# Patient Record
Sex: MALE | Race: WHITE | NOT HISPANIC OR LATINO | Employment: FULL TIME | URBAN - METROPOLITAN AREA
[De-identification: names, ages, dates, MRNs, and addresses within clinical notes are randomized per-mention and may not be internally consistent; named-entity substitution may affect disease eponyms.]

---

## 2019-10-21 ENCOUNTER — OFFICE VISIT (OUTPATIENT)
Dept: URGENT CARE | Facility: URGENT CARE | Age: 34
End: 2019-10-21
Payer: COMMERCIAL

## 2019-10-21 VITALS
SYSTOLIC BLOOD PRESSURE: 112 MMHG | DIASTOLIC BLOOD PRESSURE: 66 MMHG | OXYGEN SATURATION: 99 % | WEIGHT: 187 LBS | HEART RATE: 59 BPM | TEMPERATURE: 98.6 F

## 2019-10-21 DIAGNOSIS — L03.011 CELLULITIS OF RIGHT INDEX FINGER: Primary | ICD-10-CM

## 2019-10-21 PROCEDURE — 99203 OFFICE O/P NEW LOW 30 MIN: CPT | Performed by: PHYSICIAN ASSISTANT

## 2019-10-21 RX ORDER — CEPHALEXIN 500 MG/1
500 CAPSULE ORAL 3 TIMES DAILY
Qty: 30 CAPSULE | Refills: 0 | Status: SHIPPED | OUTPATIENT
Start: 2019-10-21 | End: 2019-10-31

## 2019-10-21 NOTE — PROGRESS NOTES
SUBJECTIVE:  Chavo Vasquez is a 34 year old male who comes in for infected finger.  States that about 1 week ago he cut the tip of the right second finger off with cooking mandolin.  Was seen a few days later at the [facility] and pressure bandage and gel foam applied.  Had kept covered.  Today noticed that finger is red and swollen and thinks that infected. No fever noted  No drainage.  Denies any other sx and is otherwise in normal state of good health    PMH  No underlying medical issue generally healthy     MED  Takes no daily med    Social History     Socioeconomic History     Marital status:      Spouse name: Not on file     Number of children: Not on file     Years of education: Not on file     Highest education level: Not on file   Occupational History     Not on file   Social Needs     Financial resource strain: Not on file     Food insecurity:     Worry: Not on file     Inability: Not on file     Transportation needs:     Medical: Not on file     Non-medical: Not on file   Tobacco Use     Smoking status: Never Smoker     Smokeless tobacco: Never Used   Substance and Sexual Activity     Alcohol use: Not on file     Drug use: Not on file     Sexual activity: Not on file   Lifestyle     Physical activity:     Days per week: Not on file     Minutes per session: Not on file     Stress: Not on file   Relationships     Social connections:     Talks on phone: Not on file     Gets together: Not on file     Attends Zoroastrianism service: Not on file     Active member of club or organization: Not on file     Attends meetings of clubs or organizations: Not on file     Relationship status: Not on file     Intimate partner violence:     Fear of current or ex partner: Not on file     Emotionally abused: Not on file     Physically abused: Not on file     Forced sexual activity: Not on file   Other Topics Concern     Not on file   Social History Narrative     Not on file     ROS  Negative other than stated  above    Exam:  GENERAL APPEARANCE: healthy, alert and no distress  MS: extremities normal- no gross deformities noted, no evidence of inflammation in joints, FROM in all extremities.  SKIN: right second finger with tip of finger healing avulsion of skin.   There is moderate erythema up just past the DIP joint.  No abscess or drainage noted. Warmth to the touch  NEURO: Normal strength and tone, sensory exam grossly normal, mentation intact and speech normal    assessment/plan:  (L03.011) Cellulitis of right index finger  (primary encounter diagnosis)  Comment:  Plan: cephALEXin (KEFLEX) 500 MG capsule          Finger soaked and cleaned.  TD was already current.  Keflex as directed and signs of spreading infection discussed and watch for fevers, increased pain or drainage and RTC.  Patient notes understanding and agrees with above plan

## 2019-10-28 ENCOUNTER — OFFICE VISIT (OUTPATIENT)
Dept: URGENT CARE | Facility: URGENT CARE | Age: 34
End: 2019-10-28
Payer: COMMERCIAL

## 2019-10-28 VITALS
DIASTOLIC BLOOD PRESSURE: 78 MMHG | SYSTOLIC BLOOD PRESSURE: 132 MMHG | OXYGEN SATURATION: 98 % | HEART RATE: 58 BPM | TEMPERATURE: 97.4 F | WEIGHT: 187.4 LBS

## 2019-10-28 DIAGNOSIS — L03.011 CELLULITIS OF RIGHT INDEX FINGER: Primary | ICD-10-CM

## 2019-10-28 PROCEDURE — 99213 OFFICE O/P EST LOW 20 MIN: CPT | Performed by: PHYSICIAN ASSISTANT

## 2020-03-11 ENCOUNTER — HEALTH MAINTENANCE LETTER (OUTPATIENT)
Age: 35
End: 2020-03-11

## 2021-01-03 ENCOUNTER — HEALTH MAINTENANCE LETTER (OUTPATIENT)
Age: 36
End: 2021-01-03

## 2021-04-25 ENCOUNTER — HEALTH MAINTENANCE LETTER (OUTPATIENT)
Age: 36
End: 2021-04-25

## 2021-10-10 ENCOUNTER — HEALTH MAINTENANCE LETTER (OUTPATIENT)
Age: 36
End: 2021-10-10

## 2022-01-17 ENCOUNTER — PRE VISIT (OUTPATIENT)
Dept: UROLOGY | Facility: CLINIC | Age: 37
End: 2022-01-17
Payer: COMMERCIAL

## 2022-05-22 ENCOUNTER — HEALTH MAINTENANCE LETTER (OUTPATIENT)
Age: 37
End: 2022-05-22

## 2022-06-10 ENCOUNTER — TRANSFERRED RECORDS (OUTPATIENT)
Dept: HEALTH INFORMATION MANAGEMENT | Facility: CLINIC | Age: 37
End: 2022-06-10

## 2022-06-29 ENCOUNTER — VIRTUAL VISIT (OUTPATIENT)
Dept: UROLOGY | Facility: CLINIC | Age: 37
End: 2022-06-29
Payer: COMMERCIAL

## 2022-06-29 DIAGNOSIS — Z30.09 ENCOUNTER FOR VASECTOMY COUNSELING: Primary | ICD-10-CM

## 2022-06-29 PROCEDURE — 99203 OFFICE O/P NEW LOW 30 MIN: CPT | Mod: 95 | Performed by: UROLOGY

## 2022-06-29 NOTE — PROGRESS NOTES
Chavo Vasquez  who is being evaluated via a billable video visit.      How would you like to obtain your AVS? MyChart  If the video visit is dropped, the invitation should be resent by: Text to cell phone: [phone]  Will anyone else be joining your video visit? No    Video-Visit Details    Type of service:  Video Visit    Video Start Time: 1:16 PM    Video End Time:1:24 PM    Originating Location (pt. Location): Home    Distant Location (provider location):  [facility]     Platform used for Video Visit: Doris      CC: Desires sterilization, consult for vasectomy.  [facility], [facility] referral.      HPI: Chavo Vasquez is a 37 year old male with 3 children, and he is intersted in getting a vasectomy for sterilization.        PMH:   No chronic medical problems   Arthritis   DJD  GERD  Ankle fracture    History of 3rd molars out.    FAMILY HX:   No history of coagulopathies.    ALLERGIES:    No Known Allergies    MEDS:   No prescription medications      SOCIAL HISTORY:  Social History     Tobacco Use     Smoking status: Never Smoker     Smokeless tobacco: Never Used        REVIEW OF SYMPTOMS:   Denies testicular pain, ED, ejaculatory problems, rashes in the groin, easy bruising or bleeding.   No constitutional, eye, ENT, heart, lung, GI, musculoskeletal, skin, neurologic, psychiatric, endocrine or hematologic complaints.       GENERAL PHYSICAL EXAM:   Exam  General- Alert, oriented, nad.  Pleasant and conversant.  Eyes- anicteric, EOMI.  Resps- normal, non-labored.  No cough  Abdomen-  nondistended.   exam- deferred.   Neurological - no tremors  Skin - no discoloration/ lesions noted  Psychiatric - no anxiety, alert & oriented.       The rest of a comprehensive physical examination is deferred due to video visit restrictions.       I discussed with him at length the risks and benefits of the procedure. He understands that this is a sterilization procedure, and not reversible  contraception. He understands that reversals, while possible, are not guaranteed to work and fairly complex. I discussed with him the option of sperm cryopreservation.     I stressed that he continues to be fertile in the post-operative period, and that he should continue using other contraceptive methods, such as a condom, until he obtains a semen analysis and we review the results to confirm success of the procedure and infertility. I also stressed to him that recanalization and pregnancy can occur in about 1 per thousand cases, possibly more even after we clear him with a semen analysis showing no motile sperm. I counseled him on the nikita-operative risks of bleeding, infection, pain.  I described to him post-vasectomy pain syndrome that can occur in about 1 to 2% of men undergoing vasectomy.     We also discussed recovery times (typically days if no complications) and post-operative care including use of ice packs, pain medication and wound care.      Schedule vasectomy procedure in clinic.       Additional Coding Information:    Problems:  One acute uncomplicated illness or injury    Data Reviewed  Minimal or none    Level of risk:   low risk (e.g., OTC medication or observation, minor surgery without risks)    Time spent:  8 minutes spent on the date of the encounter doing chart review, history and exam, documentation and further activities as noted above. This included the video chat time above.

## 2022-06-30 ENCOUNTER — TELEPHONE (OUTPATIENT)
Dept: UROLOGY | Facility: CLINIC | Age: 37
End: 2022-06-30

## 2022-06-30 NOTE — TELEPHONE ENCOUNTER
Vasectomy    Elizabeth stein Procedure   Dermatology, General and Plastic Surgery, Urology Specialties   [facility] [place] [phone]

## 2022-09-18 ENCOUNTER — HEALTH MAINTENANCE LETTER (OUTPATIENT)
Age: 37
End: 2022-09-18

## 2023-01-06 ENCOUNTER — OFFICE VISIT (OUTPATIENT)
Dept: UROLOGY | Facility: CLINIC | Age: 38
End: 2023-01-06
Payer: COMMERCIAL

## 2023-01-06 VITALS
SYSTOLIC BLOOD PRESSURE: 145 MMHG | DIASTOLIC BLOOD PRESSURE: 86 MMHG | HEIGHT: 72 IN | HEART RATE: 71 BPM | WEIGHT: 185 LBS | BODY MASS INDEX: 25.06 KG/M2

## 2023-01-06 DIAGNOSIS — Z30.09 ENCOUNTER FOR VASECTOMY COUNSELING: Primary | ICD-10-CM

## 2023-01-06 PROCEDURE — 55250 REMOVAL OF SPERM DUCT(S): CPT | Performed by: UROLOGY

## 2023-01-06 RX ORDER — ALGINIC ACID
POWDER (GRAM) MISCELLANEOUS
COMMUNITY

## 2023-01-06 RX ORDER — IBUPROFEN 400 MG/1
TABLET, FILM COATED ORAL
COMMUNITY

## 2023-01-06 ASSESSMENT — PAIN SCALES - GENERAL: PAINLEVEL: NO PAIN (0)

## 2023-01-06 NOTE — PATIENT INSTRUCTIONS
What Can I Expect After My Vasectomy?     Your scrotum may be swollen and bruised. We suggest you:  - Raise the area and apply ice packs (or frozen vegetables). Use the ice packs for 20 minutes on and 20 minutes off for 24 to 36 hours.  - Wear a jock strap or tight briefs for support for a week.  - Limit your activity for the first 24 to 36 hours. Wait up to 48 hours, if you feel the need. No heavy lifting for 1 week.     You should be able to return to work the next day, unless you do heavy physical work.     You can safely ejaculate (have a sexual climax) in about one week, or when it feels comfortable.    Risk of pregnancy    After your vasectomy, you can still get your partner pregnant for three months or more. Use extra birth control, such as condoms, until tests show that you are sterile (no live sperm).    Vasectomy is not 100 percent reliable. Pregnancy may occur for about 1 out of 2000 men even after testing shows zero sperm count.    Can a vasectomy be reversed?    Vasectomy is meant to be birth control that lasts a lifetime. If you change your mind, we can try to reverse it with surgery. But this will not be successful in every case.    Sperm count test    You will have a sperm-count test after the vasectomy. You should have had at least 20 ejaculations (discharges of semen) since your surgery. It will be a few months before you are sterile. Ten to twelve weeks after your surgery, schedule a sperm count test. Call [phone] to schedule. We will call you in 2 weeks with the results.    If you have any questions, please contact us:    [facility] [place] [facility] (nurse line): [phone]

## 2023-01-06 NOTE — LETTER
1/6/2023       RE: Chavo Vasquez  [address]  [place] [address]     Dear Colleague,    Thank you for referring your patient, Chavo Vasquez, to the [facility] [place] at [facility]. Please see a copy of my visit note below.    Procedure: Vasectomy bilateral.   Anesthesia: Local lidocaine injection by surgeon.  Surgeon: VALERIE Pavon M.D.   Assistant:  none    Description of procedure: After the patient received education material regarding vasectomy, including risks and benefits, we proceeded with obtaining consent and proceeded with the surgery. He understands that there is a risk of late failure from recanalization. He understands that he is fertile until he has completed one or more semen analyses and he is given clearance from us for unprotected intercourse.  He understands that we will contact regarding the results but it is his responsibility to make sure he is cleared before having unprotected intercourse.  I have discussed with him other risks including bleeding, infection, acute and possible long-term pain.    The right vas was ligated first.  This was done using the standard technique by grasping it with a three-finger  and lifting it up to the skin.  A small amount of lidocaine was infiltrated into the skin and vasal sheath.  The skin was punctured and dilated bluntly using the scalpel-less dissector.  The sheath was identified and a rigid clamp was passed around the vas which was then lifted out of the incision.  The vas was cleaned off from the deferential vessels and the sheath, and cautery was used to divide the vas between mosquitos.  A small segment was removed and discarded.  The lumen of the vas was cannulated to confirm its identity, and the lumens of both ends were cauterized.  Pen cautery was used sparingly/as needed for minor bleeders.  A facial interposition was then performed with a single suture of 4-0  chromic. The skin defect was closed with a 4-0 chromic interrupted suture after confirming adequate hemostasis.       We then turned our attention to the left side and a similar technique was performed. This involved division, excision of a segment, cautery of the lumens, and fascial interposition.    There were no hematomas noted at the end of the procedure.  The patient tolerated the procedure well.    He was advised to return for a post vasectomy semen check in 2-3 months, and that he is not sterile and must continue to use contraception until we tell him otherwise (based on follow-up semen specimen(s)).    Plan:  -Post vasectomy semen analysis in 2-3 months   -ice packs to scrotum X 24h  -shower ok tomorrow  -OTC analgesics recommended     Jan CERNA                     Again, thank you for allowing me to participate in the care of your patient.      Sincerely,    Elías Pavon MD

## 2023-01-06 NOTE — PROGRESS NOTES
Procedure: Vasectomy bilateral.   Anesthesia: Local lidocaine injection by surgeon.  Surgeon: VALERIE Pavon M.D.   Assistant:  none    Description of procedure: After the patient received education material regarding vasectomy, including risks and benefits, we proceeded with obtaining consent and proceeded with the surgery. He understands that there is a risk of late failure from recanalization. He understands that he is fertile until he has completed one or more semen analyses and he is given clearance from us for unprotected intercourse.  He understands that we will contact regarding the results but it is his responsibility to make sure he is cleared before having unprotected intercourse.  I have discussed with him other risks including bleeding, infection, acute and possible long-term pain.    The right vas was ligated first.  This was done using the standard technique by grasping it with a three-finger  and lifting it up to the skin.  A small amount of lidocaine was infiltrated into the skin and vasal sheath.  The skin was punctured and dilated bluntly using the scalpel-less dissector.  The sheath was identified and a rigid clamp was passed around the vas which was then lifted out of the incision.  The vas was cleaned off from the deferential vessels and the sheath, and cautery was used to divide the vas between mosquitos.  A small segment was removed and discarded.  The lumen of the vas was cannulated to confirm its identity, and the lumens of both ends were cauterized.  Pen cautery was used sparingly/as needed for minor bleeders.  A facial interposition was then performed with a single suture of 4-0 chromic. The skin defect was closed with a 4-0 chromic interrupted suture after confirming adequate hemostasis.       We then turned our attention to the left side and a similar technique was performed. This involved division, excision of a segment, cautery of the lumens, and fascial interposition.    There were no  hematomas noted at the end of the procedure.  The patient tolerated the procedure well.    He was advised to return for a post vasectomy semen check in 2-3 months, and that he is not sterile and must continue to use contraception until we tell him otherwise (based on follow-up semen specimen(s)).    Plan:  -Post vasectomy semen analysis in 2-3 months   -ice packs to scrotum X 24h  -shower ok tomorrow  -OTC analgesics recommended     Jan CERNA

## 2023-01-06 NOTE — NURSING NOTE
"Chief Complaint   Patient presents with     Sterilization     Vasectomy       Blood pressure (!) 145/86, pulse 71, height 1.816 m (5' 11.5\"), weight 83.9 kg (185 lb). Body mass index is 25.44 kg/m .    There is no problem list on file for this patient.      No Known Allergies    Current Outpatient Medications   Medication Sig Dispense Refill     Alginic Acid POWD CHEW TWO TABLETS BY MOUTH PRN (Patient not taking: Reported on 2023)       ibuprofen (ADVIL/MOTRIN) 400 MG tablet TAKE ONE TABLET BY MOUTH PRN (Patient not taking: Reported on 2023)         Social History     Tobacco Use     Smoking status: Former     Types: Cigarettes     Smokeless tobacco: Never       Invasive Procedure Safety Checklist:    Procedure: Bilateral Vasectomy    Action: Complete sections and checkboxes as appropriate.    Pre-procedure:  1. Patient ID Verified with 2 identifiers (Faith and  or MRN) : YES    2. Procedure and site verified with patient/designee (when able) : YES    3. Accurate consent documentation in medical record : YES    4. H&P (or appropriate assessment) documented in medical record : N/A  H&P must be up to 30 days prior to procedure an updated within 24 hours of                 Procedure as applicable.     5. Relevant diagnostic and radiology test results appropriately labeled and displayed as applicable : YES    6. Blood products, implants, devices, and/or special equipment available for the procedure as applicable : YES    7. Procedure site(s) marked with provider initials [Exclusions: none] : NO    8. Marking not required. Reason : Yes  Procedure does not require site marking    Time Out:     Time-Out performed immediately prior to starting procedure, including verbal and active participation of all team members addressing: YES    1. Correct patient identity.  2. Confirmed that the correct side and site are marked.  3. An accurate procedure to be done.  4. Agreement on the procedure to be done.  5. Correct " patient position.  6. Relevant images and results are properly labeled and appropriately displayed.  7. The need to administer antibiotics or fluids for irrigation purposes during the procedure as applicable.  8. Safety precautions based on patient history or medication use.    During Procedure: Verification of correct person, site, and procedure occurs any time the responsibility for care of the patient is transferred to another member of the care team.    The following medication was given:     MEDICATION:  Lidocaine without epinephrine 2%  ROUTE: administered by physician - scrotal   SITE: administered by physician - scrotal   DOSE: 5 mL  LOT #: [id number]  : [employer]  EXPIRATION DATE: 02/26  NDC#: 10637-108-48   Was there drug waste? Yes  Amount of drug waste (mL): 15 mL.  Reason for waste:  Single use vial    Prior to injection, verified patient identity using patient's name and date of birth.  Due to injection administration, patient instructed to remain in clinic for 15 minutes  afterwards, and to report any adverse reaction to me immediately.    Drug Amount Wasted:  Yes: 15 mL (20 mg/ml)  Vial/Syringe: Single dose vial      Adam Isaacs, AMY  1/6/2023  9:19 AM

## 2023-01-06 NOTE — LETTER
Date:January 6, 2023      Patient was self referred, no letter generated. Do not send.        [facility] Health Information

## 2023-03-16 ENCOUNTER — LAB (OUTPATIENT)
Dept: LAB | Facility: CLINIC | Age: 38
End: 2023-03-16
Payer: COMMERCIAL

## 2023-03-16 DIAGNOSIS — Z30.09 ENCOUNTER FOR VASECTOMY COUNSELING: ICD-10-CM

## 2023-03-16 PROCEDURE — 89310 SEMEN ANALYSIS W/COUNT: CPT

## 2023-03-17 DIAGNOSIS — Z98.52 S/P VASECTOMY: Primary | ICD-10-CM

## 2023-03-17 LAB
ABSTINENCE DAYS: 4 DAYS (ref 2–7)
AGGLUTINATION: NO
ANALYSIS TEMP - CENTIGRADE: 24 CENTIGRADE
COLLECTION METHOD: NORMAL
COLLECTION SITE: NORMAL
CONSENT TO RELEASE TO PARTNER: YES
DAL- RECEIVED TIME: NORMAL
LIQUEFIED: YES
ROUND CELLS: 0 MILLION/ML
SPECIMEN PH: 7.2 PH
SPECIMEN VOLUME: 2.4 ML
SPERM CONCENTRATION: 0 MILLION/ML
TIME OF ANALYSIS: NORMAL
TOTAL SPERM NUMBER: 0 MILLION
VISCOUS: NO

## 2023-03-18 NOTE — RESULT ENCOUNTER NOTE
Dear Chavo     Your post-vasectomy semen analysis shows very rare presence of a few non-motile (dead) sperm.    This sample meets current criteria for success (under 100,000 /mLnon-motile sperm).    The chance of pregnancy for men with this result is the same as if zero sperm were seen (about 1 in 2000) so you're OK for unprotected intercourse if you are OK with these risks.    If you want to be extra careful we can have you repeat a semen analysis in a month, to confirm results.  You can call [facility] [phone] to schedule at your convenience.    Thank You  Let me know if you have any questions.    Jan CERNA

## 2023-06-04 ENCOUNTER — HEALTH MAINTENANCE LETTER (OUTPATIENT)
Age: 38
End: 2023-06-04

## 2024-07-14 ENCOUNTER — HEALTH MAINTENANCE LETTER (OUTPATIENT)
Age: 39
End: 2024-07-14

## 2025-07-19 ENCOUNTER — HEALTH MAINTENANCE LETTER (OUTPATIENT)
Age: 40
End: 2025-07-19

## (undated) RX ORDER — LIDOCAINE HYDROCHLORIDE 20 MG/ML
JELLY TOPICAL
Status: DISPENSED
Start: 2023-01-06

## (undated) RX ORDER — LIDOCAINE HYDROCHLORIDE 20 MG/ML
INJECTION, SOLUTION INFILTRATION; PERINEURAL
Status: DISPENSED
Start: 2023-01-06